# Patient Record
Sex: FEMALE | Race: WHITE | NOT HISPANIC OR LATINO | Employment: FULL TIME | URBAN - METROPOLITAN AREA
[De-identification: names, ages, dates, MRNs, and addresses within clinical notes are randomized per-mention and may not be internally consistent; named-entity substitution may affect disease eponyms.]

---

## 2023-10-24 PROBLEM — E53.8 VITAMIN B12 DEFICIENCY: Status: ACTIVE | Noted: 2023-10-24

## 2023-10-24 PROBLEM — C50.919 BREAST CANCER (MULTI): Status: ACTIVE | Noted: 2023-10-24

## 2023-10-24 PROBLEM — F41.9 ANXIETY: Status: ACTIVE | Noted: 2023-10-24

## 2023-10-24 RX ORDER — VENLAFAXINE HYDROCHLORIDE 75 MG/1
CAPSULE, EXTENDED RELEASE ORAL
COMMUNITY

## 2023-10-24 RX ORDER — MAGNESIUM 200 MG
TABLET ORAL
COMMUNITY
Start: 2017-06-01

## 2023-10-24 RX ORDER — EPINEPHRINE 0.3 MG/.3ML
INJECTION INTRAMUSCULAR
COMMUNITY
Start: 2018-08-04

## 2023-10-24 RX ORDER — VALACYCLOVIR HYDROCHLORIDE 500 MG/1
TABLET, FILM COATED ORAL
COMMUNITY
Start: 2018-10-09

## 2023-10-24 RX ORDER — LETROZOLE 2.5 MG/1
TABLET, FILM COATED ORAL
COMMUNITY

## 2023-10-24 RX ORDER — TAMOXIFEN CITRATE 20 MG/1
TABLET ORAL
COMMUNITY

## 2023-10-24 RX ORDER — ASCORBIC ACID 500 MG
TABLET ORAL
COMMUNITY

## 2023-10-24 ASSESSMENT — PATIENT HEALTH QUESTIONNAIRE - PHQ9
5. POOR APPETITE OR OVEREATING: NOT AT ALL
4. FEELING TIRED OR HAVING LITTLE ENERGY: SEVERAL DAYS
9. THOUGHTS THAT YOU WOULD BE BETTER OFF DEAD, OR OF HURTING YOURSELF: 0
3. TROUBLE FALLING OR STAYING ASLEEP OR SLEEPING TOO MUCH: NOT AT ALL
4. FEELING TIRED OR HAVING LITTLE ENERGY: 1
7. TROUBLE CONCENTRATING ON THINGS, SUCH AS READING THE NEWSPAPER OR WATCHING TELEVISION: NOT AT ALL
5. POOR APPETITE OR OVEREATING: NOT AT ALL
9. THOUGHTS THAT YOU WOULD BE BETTER OFF DEAD, OR OF HURTING YOURSELF: NOT AT ALL
SUM OF ALL RESPONSES TO PHQ QUESTIONS 1-9: 1
1. LITTLE INTEREST OR PLEASURE IN DOING THINGS: 0
1. LITTLE INTEREST OR PLEASURE IN DOING THINGS: NOT AT ALL
6. FEELING BAD ABOUT YOURSELF - OR THAT YOU ARE A FAILURE OR HAVE LET YOURSELF OR YOUR FAMILY DOWN: NOT AT ALL
6. FEELING BAD ABOUT YOURSELF - OR THAT YOU ARE A FAILURE OR HAVE LET YOURSELF OR YOUR FAMILY DOWN: 0
10. IF YOU CHECKED OFF ANY PROBLEMS, HOW DIFFICULT HAVE THESE PROBLEMS MADE IT FOR YOU TO DO YOUR WORK, TAKE CARE OF THINGS AT HOME, OR GET ALONG WITH OTHER PEOPLE: NOT DIFFICULT AT ALL
8. MOVING OR SPEAKING SO SLOWLY THAT OTHER PEOPLE COULD HAVE NOTICED. OR THE OPPOSITE, BEING SO FIGETY OR RESTLESS THAT YOU HAVE BEEN MOVING AROUND A LOT MORE THAN USUAL: NOT AT ALL
9. THOUGHTS THAT YOU WOULD BE BETTER OFF DEAD, OR OF HURTING YOURSELF: NOT AT ALL
7. TROUBLE CONCENTRATING ON THINGS, SUCH AS READING THE NEWSPAPER OR WATCHING TELEVISION: NOT AT ALL
2. FEELING DOWN, DEPRESSED OR HOPELESS: NOT AT ALL
1. LITTLE INTEREST OR PLEASURE IN DOING THINGS: NOT AT ALL
8. MOVING OR SPEAKING SO SLOWLY THAT OTHER PEOPLE COULD HAVE NOTICED. OR THE OPPOSITE, BEING SO FIGETY OR RESTLESS THAT YOU HAVE BEEN MOVING AROUND A LOT MORE THAN USUAL: NOT AT ALL
10. IF YOU CHECKED OFF ANY PROBLEMS, HOW DIFFICULT HAVE THESE PROBLEMS MADE IT FOR YOU TO DO YOUR WORK, TAKE CARE OF THINGS AT HOME, OR GET ALONG WITH OTHER PEOPLE: NOT DIFFICULT AT ALL
3. TROUBLE FALLING OR STAYING ASLEEP OR SLEEPING TOO MUCH: NOT AT ALL
6. FEELING BAD ABOUT YOURSELF - OR THAT YOU ARE A FAILURE OR HAVE LET YOURSELF OR YOUR FAMILY DOWN: NOT AT ALL
3. TROUBLE FALLING OR STAYING ASLEEP OR SLEEPING TOO MUCH: 0
5. POOR APPETITE OR OVEREATING: 0
2. FEELING DOWN, DEPRESSED, IRRITABLE, OR HOPELESS: 0
4. FEELING TIRED OR HAVING LITTLE ENERGY: SEVERAL DAYS
8. MOVING OR SPEAKING SO SLOWLY THAT OTHER PEOPLE COULD HAVE NOTICED. OR THE OPPOSITE, BEING SO FIGETY OR RESTLESS THAT YOU HAVE BEEN MOVING AROUND A LOT MORE THAN USUAL: 0
7. TROUBLE CONCENTRATING ON THINGS, SUCH AS READING THE NEWSPAPER OR WATCHING TELEVISION: 0
SUM OF ALL RESPONSES TO PHQ QUESTIONS 1-9: 1
2. FEELING DOWN, DEPRESSED, IRRITABLE, OR HOPELESS: NOT AT ALL

## 2023-10-25 ENCOUNTER — OFFICE VISIT (OUTPATIENT)
Dept: HEMATOLOGY/ONCOLOGY | Facility: CLINIC | Age: 58
End: 2023-10-25
Payer: COMMERCIAL

## 2023-10-25 VITALS
RESPIRATION RATE: 18 BRPM | WEIGHT: 184.97 LBS | OXYGEN SATURATION: 97 % | HEIGHT: 63 IN | TEMPERATURE: 96.3 F | SYSTOLIC BLOOD PRESSURE: 109 MMHG | HEART RATE: 94 BPM | BODY MASS INDEX: 32.77 KG/M2 | DIASTOLIC BLOOD PRESSURE: 61 MMHG

## 2023-10-25 DIAGNOSIS — Z17.0 MALIGNANT NEOPLASM OF LEFT BREAST IN FEMALE, ESTROGEN RECEPTOR POSITIVE, UNSPECIFIED SITE OF BREAST (MULTI): Primary | ICD-10-CM

## 2023-10-25 DIAGNOSIS — I89.0 LYMPHEDEMA OF BREAST: ICD-10-CM

## 2023-10-25 DIAGNOSIS — C50.912 MALIGNANT NEOPLASM OF LEFT BREAST IN FEMALE, ESTROGEN RECEPTOR POSITIVE, UNSPECIFIED SITE OF BREAST (MULTI): Primary | ICD-10-CM

## 2023-10-25 PROCEDURE — 99214 OFFICE O/P EST MOD 30 MIN: CPT

## 2023-10-25 PROCEDURE — 1036F TOBACCO NON-USER: CPT

## 2023-10-25 ASSESSMENT — ENCOUNTER SYMPTOMS
DEPRESSION: 0
OCCASIONAL FEELINGS OF UNSTEADINESS: 0
LOSS OF SENSATION IN FEET: 0

## 2023-10-25 ASSESSMENT — COLUMBIA-SUICIDE SEVERITY RATING SCALE - C-SSRS
6. HAVE YOU EVER DONE ANYTHING, STARTED TO DO ANYTHING, OR PREPARED TO DO ANYTHING TO END YOUR LIFE?: NO
1. IN THE PAST MONTH, HAVE YOU WISHED YOU WERE DEAD OR WISHED YOU COULD GO TO SLEEP AND NOT WAKE UP?: NO
2. HAVE YOU ACTUALLY HAD ANY THOUGHTS OF KILLING YOURSELF?: NO

## 2023-10-25 ASSESSMENT — PATIENT HEALTH QUESTIONNAIRE - PHQ9
1. LITTLE INTEREST OR PLEASURE IN DOING THINGS: NOT AT ALL
2. FEELING DOWN, DEPRESSED OR HOPELESS: NOT AT ALL
SUM OF ALL RESPONSES TO PHQ9 QUESTIONS 1 AND 2: 0

## 2023-10-25 ASSESSMENT — PAIN SCALES - GENERAL: PAINLEVEL: 0-NO PAIN

## 2023-10-25 NOTE — PROGRESS NOTES
Patient ID: Laura Espinoza is a 58 y.o. female.    The patient presents to clinic today for her history of breast cancer.     Cancer Staging   Breast cancer (CMS/[facility])  Staging form: Breast, AJCC 8th Edition  - Clinical: Stage IA (cT1mi, cN0, cM0, G2, ER+, PA+, HER2-) - Unsigned        Diagnostic/Therapeutic History:  She initially presented with an abnormal mammogram last October 2017 that showed asymmetry in the left breast.  Ultrasound done showed an ill defined hypoechoic mass identified at the 3 o'clock position measuring 7 x 5 x 5 mm. Mammogram guided biopsy done showed invasive lobular carcinoma, grade 2. The limited sample size measured0.3 cm. It is ER positive at 95%,  PA positive at 98% and HER-2 negative.     December 20, 2017, she had a wire localization lumpectomy and specimen showed four foci of malignancy.  They measured 7 mm, 2 mm, 9.5 mm and 0.5 mm of invasive lobular carcinoma, grade 2. Margins were not  involved. Two sentinel lymph nodes were negative. There was no lymphovascular invasion identified.     Oncotype score showed a low risk of reoccurrence with a reoccurrence score of 13. She underwent radiation therapy finishing last March 23, 2018, completing 42.56 Gy in 16 fractions to the left breast. She then started on Tamoxifen.  She continued on tamoxifen and followed with her GYN. There was some thickening of her uterus and because of that she had undergone a vaginal hysterectomy with bilateral oophorectomy. The pathology came back negative for cancer.  In December 2018, she was started on Arimidex. She complained of arthritic discomfort with Arimidex and because of that she was switched to exemestane, which she took for awhile, but that did not seem to be any better. Feb 2020 switched to letrozole.  Stopped all therapy May 2022- 1 year short of planned 5 years of therapy    History of Present Illness (HPI)/Interval History:  Ms. Espinoza presents for presents today for follow-up, treatment and  surveillance. She is on observational therapy.     She denies any new breast cancer concerns. Left breast feels heavier.     She denies any chest pain or breathing issues.     She denies any vision changes or headache issues, dizziness, loss of balance or falls.     She denies any new or unexplained bone aches or pains. Right knee pain since fall in Feb, tripped.     She denies any skin lesions or masses.    She reports a normal appetite and normal bowel movements.    She denies any issues with sleep, fatigue,  or mood swings. Mild but manageable hot flashes, getting better with time.     Review of Systems:  14-point ROS otherwise negative, as per HPI.    Past Medical History:   Diagnosis Date    Personal history of malignant neoplasm of breast 04/08/2020    Personal history of malignant neoplasm of breast       Past Surgical History:   Procedure Laterality Date    BI MAMMO GUIDED LOCALIZATION BREAST LEFT Left 12/20/2017    BI MAMMO GUIDED LOCALIZATION BREAST LEFT 12/20/2017 VAL SURG [facility]       Social History     Socioeconomic History    Marital status:      Spouse name: None    Number of children: None    Years of education: None    Highest education level: None   Occupational History    None   Tobacco Use    Smoking status: Never    Smokeless tobacco: Never   Vaping Use    Vaping Use: Never used   Substance and Sexual Activity    Alcohol use: Yes     Comment: holidays    Drug use: Never    Sexual activity: None   Other Topics Concern    None   Social History Narrative    None     Social Determinants of Health     Financial Resource Strain: Not on file   Food Insecurity: Not on file   Transportation Needs: Not on file   Physical Activity: Not on file   Stress: Not on file   Social Connections: Not on file   Intimate Partner Violence: Not on file   Housing Stability: Not on file       Allergies   Allergen Reactions    Bee Venom Protein (Honey Bee) Other         Current Outpatient Medications:      "ascorbic acid (Vitamin C) 500 mg tablet, Vitamin C TABS  Refills: 0     Active, Disp: , Rfl:     bupropion HCl (WELLBUTRIN ORAL), Wellbutrin TABS  Refills: 0     Active, Disp: , Rfl:     cholecalciferol, vitamin D3, (VITAMIN D3 ORAL), 1 cap(s) orally once a day, Disp: , Rfl:     cyanocobalamin, vitamin B-12, 1,000 mcg tablet, sublingual, 1 tablet under the tongue and allow to dissolve Sublingual Once a day for 90 day(s), Disp: , Rfl:     EPINEPHrine (EpiPen 2-Beni) 0.3 mg/0.3 mL injection syringe, 0.3 milligram(s) intramuscularly once a day, As Needed, Disp: , Rfl:     letrozole (Femara) 2.5 mg tablet, Letrozole TABS  Refills: 0     Active, Disp: , Rfl:     tamoxifen (Nolvadex) 20 mg tablet, 1 tablet Orally Once a day, Disp: , Rfl:     valACYclovir (Valtrex) 500 mg tablet, 2 tablets Orally tid for 7 day(s), Disp: , Rfl:     venlafaxine XR (Effexor XR) 75 mg 24 hr capsule, 1 tab oral qhs, Disp: , Rfl:      Objective    BSA: 1.93 meters squared  /61 (BP Location: Left arm, Patient Position: Sitting, BP Cuff Size: Adult)   Pulse 94   Temp 35.7 °C (96.3 °F)   Resp 18   Ht 1.591 m (5' 2.64\")   Wt 83.9 kg (184 lb 15.5 oz)   SpO2 97%   BMI 33.15 kg/m²     Performance Status:  The ECOG performance scale today is ECO- Fully active, able to carry on all pre-disease performance w/o restriction.    Physical Exam  Vitals reviewed.   Constitutional:       General: She is awake. She is not in acute distress.     Appearance: Normal appearance. She is not ill-appearing.   HENT:      Mouth/Throat:      Pharynx: Oropharynx is clear. No oropharyngeal exudate.   Eyes:      General: No scleral icterus.     Conjunctiva/sclera: Conjunctivae normal.   Neck:      Trachea: Trachea and phonation normal. No tracheal tenderness.   Cardiovascular:      Rate and Rhythm: Normal rate and regular rhythm.      Heart sounds: No murmur heard.     No friction rub. No gallop.   Pulmonary:      Effort: Pulmonary effort is normal. No " respiratory distress.      Breath sounds: Normal breath sounds. No stridor. No wheezing, rhonchi or rales.   Chest:      Chest wall: No mass, lacerations, deformity or tenderness.   Breasts:     Right: No swelling, mass, nipple discharge, skin change or tenderness.      Left: Swelling (lymphedema in the breast) present. No mass, nipple discharge, skin change or tenderness.      Comments: S/p left lumpectomy   Abdominal:      General: Abdomen is flat. There is no distension.      Palpations: Abdomen is soft. There is no mass.      Tenderness: There is no abdominal tenderness.   Lymphadenopathy:      Cervical: No cervical adenopathy.      Upper Body:      Right upper body: No supraclavicular, axillary or pectoral adenopathy.      Left upper body: No supraclavicular, axillary or pectoral adenopathy.   Skin:     General: Skin is warm and dry.      Coloration: Skin is not jaundiced.      Findings: No lesion or rash.   Neurological:      General: No focal deficit present.      Mental Status: She is alert and oriented to person, place, and time.      Motor: No weakness.      Gait: Gait normal.   Psychiatric:         Mood and Affect: Mood normal.         Thought Content: Thought content normal.         Judgment: Judgment normal.         Laboratory Data:  Lab Results   Component Value Date    WBC 3.9 (L) 04/14/2021    HGB 12.7 04/14/2021    HCT 39.0 04/14/2021    MCV 92.9 04/14/2021     04/14/2021       Chemistry    Lab Results   Component Value Date/Time     05/12/2022 1110    K 3.9 05/12/2022 1110     05/12/2022 1110    CO2 30 05/12/2022 1110    BUN 13 05/12/2022 1110    CREATININE 0.77 05/12/2022 1110    Lab Results   Component Value Date/Time    CALCIUM 9.2 05/12/2022 1110    ALKPHOS 79 05/12/2022 1110    AST 19 05/12/2022 1110    ALT 18 05/12/2022 1110    BILITOT 1.0 05/12/2022 1110             Radiology:  BI digital DIAG mamm  Narrative: Interpreted By:  DAYNE PLEITEZ MD  MRN: [id number]  Patient  Name: LAURA ESPINOZA     STUDY:  Bilateral digital  diagnostic mammograms with tomosynthesis imaging  dated  6/5/2023.     INDICATION:  History of left breast cancer with lumpectomy and radiation treatment.     COMPARISON:  Mammograms dated  10/22/2021 through 06/01/2022.     ACCESSION NUMBER(S):  [id number]     ORDERING CLINICIAN:  ERIC ROONEY     TECHNIQUE:  Bilateral  CC and MLO  2D digital mammograms and digital breast  tomosynthesis mammograms were obtained.   CAD was utilized for the  interpretation of these mammograms.  Bilateral 3-D volume mammograms  were reconstructed in 1 mm slices in the  CC and MLO planes.     FINDINGS:  There are areas of scattered fibroglandular tissue.  Postoperative  and postradiation changes in the left breast similar to prior. No  suspicious mass or asymmetry is evident.  No suspicious calcification  or architectural distortion is evident.  The breasts are similar to  prior mammograms.     Impression: No mammographic finding is evident to suggest malignancy. Recommend  return for yearly screening bilateral mammograms or sooner if  clinically warranted.     BI-RADS CATEGORY:     Category: 2 - Benign.  Recommendation: 1 Year Screening.       No results found for this or any previous visit from the past 365 days.          Assessment/Plan:    Laura Espinoza is a 58 y.o. female with a history of left breast cancer, who presents today follow-up evaluation.    Assess/Plan SmartLinks:   Problem List Items Addressed This Visit             ICD-10-CM    Breast cancer (CMS/[facility]) - Primary C50.919     - Next mammogram due after 6/5/2024, ordered today   - Continue observational therapy          Relevant Orders    BI mammo bilateral screening tomosynthesis    Clinic Appointment Request NATALIE JIANG    Lymphedema of breast I89.0     - Good compression bra and massage   - If worsening would refer to lymphedema clinic               Disposition.  - RTC 1 year with Natalie Jiang PA-C and  mammogram in June 2024   - She was given our contact information and instructed to call with concerns/questions in the interim.    No orders of the defined types were placed in this encounter.            Natalie Jiang PA-C  Hematology and Medical Oncology  [facility]

## 2024-06-06 ENCOUNTER — HOSPITAL ENCOUNTER (OUTPATIENT)
Dept: RADIOLOGY | Facility: HOSPITAL | Age: 59
Discharge: HOME | End: 2024-06-06
Payer: COMMERCIAL

## 2024-06-06 VITALS — BODY MASS INDEX: 31.71 KG/M2 | WEIGHT: 179 LBS | HEIGHT: 63 IN

## 2024-06-06 DIAGNOSIS — C50.912 MALIGNANT NEOPLASM OF LEFT BREAST IN FEMALE, ESTROGEN RECEPTOR POSITIVE, UNSPECIFIED SITE OF BREAST (MULTI): ICD-10-CM

## 2024-06-06 DIAGNOSIS — Z17.0 MALIGNANT NEOPLASM OF LEFT BREAST IN FEMALE, ESTROGEN RECEPTOR POSITIVE, UNSPECIFIED SITE OF BREAST (MULTI): ICD-10-CM

## 2024-06-06 PROCEDURE — 77067 SCR MAMMO BI INCL CAD: CPT

## 2024-06-06 PROCEDURE — 77063 BREAST TOMOSYNTHESIS BI: CPT | Performed by: RADIOLOGY

## 2024-06-06 PROCEDURE — 77067 SCR MAMMO BI INCL CAD: CPT | Performed by: RADIOLOGY

## 2024-10-21 NOTE — PROGRESS NOTES
Airway assessment complete   Patient ID: Laura Espinoza is a 59 y.o. female.    The patient presents to clinic today for her history of breast cancer.     Cancer Staging   Breast cancer (Multi)  Staging form: Breast, AJCC 8th Edition  - Clinical: Stage IA (cT1mi, cN0, cM0, G2, ER+, AZ+, HER2-) - Unsigned        Diagnostic/Therapeutic History:  She initially presented with an abnormal mammogram last October 2017 that showed asymmetry in the left breast.  Ultrasound done showed an ill defined hypoechoic mass identified at the 3 o'clock position measuring 7 x 5 x 5 mm. Mammogram guided biopsy done showed invasive lobular carcinoma, grade 2. The limited sample size measured0.3 cm. It is ER positive at 95%,  AZ positive at 98% and HER-2 negative.     December 20, 2017, she had a wire localization lumpectomy and specimen showed four foci of malignancy.  They measured 7 mm, 2 mm, 9.5 mm and 0.5 mm of invasive lobular carcinoma, grade 2. Margins were not  involved. Two sentinel lymph nodes were negative. There was no lymphovascular invasion identified.     Oncotype score showed a low risk of reoccurrence with a reoccurrence score of 13. She underwent radiation therapy finishing last March 23, 2018, completing 42.56 Gy in 16 fractions to the left breast. She then started on Tamoxifen.  She continued on tamoxifen and followed with her GYN. There was some thickening of her uterus and because of that she had undergone a vaginal hysterectomy with bilateral oophorectomy. The pathology came back negative for cancer.  In December 2018, she was started on Arimidex. She complained of arthritic discomfort with Arimidex and because of that she was switched to exemestane, which she took for awhile, but that did not seem to be any better. Feb 2020 switched to letrozole.  Stopped all therapy May 2022- 1 year short of planned 5 years of therapy    History of Present Illness (HPI)/Interval History:  Ms. Espinoza presents for presents today for follow-up, treatment and  surveillance. She is on observational therapy.     She denies any new breast cancer concerns. Some occasional tenderness to the left breast but stable.     She denies any chest pain or breathing issues.     She denies any vision changes, dizziness, loss of balance or falls. More frequent headaches in the last month, takes ibuprofen as needed.     She denies any new or unexplained bone aches or pains.     She denies any skin lesions or masses.    She reports a normal appetite.     She denies any issues with sleep, fatigue,  or mood swings. Mild but manageable hot flashes, getting better with time.     Review of Systems:  14-point ROS otherwise negative, as per HPI.    Past Medical History:   Diagnosis Date    Breast cancer (Multi)     Personal history of malignant neoplasm of breast 04/08/2020    Personal history of malignant neoplasm of breast       Past Surgical History:   Procedure Laterality Date    BI MAMMO GUIDED BREAST LEFT LOCALIZATION Left 12/20/2017    BI MAMMO GUIDED LOCALIZATION BREAST LEFT 12/20/2017 VAL SURG [facility]    BREAST LUMPECTOMY Left 2017    rad tx, no chemo       Social History     Socioeconomic History    Marital status:    Tobacco Use    Smoking status: Never    Smokeless tobacco: Never   Vaping Use    Vaping status: Never Used   Substance and Sexual Activity    Alcohol use: Yes     Comment: holidays    Drug use: Never       Allergies   Allergen Reactions    Bee Venom Protein (Honey Bee) Other         Current Outpatient Medications:     ascorbic acid (Vitamin C) 500 mg tablet, Vitamin C TABS  Refills: 0     Active, Disp: , Rfl:     bupropion HCl (WELLBUTRIN ORAL), Wellbutrin TABS  Refills: 0     Active, Disp: , Rfl:     cholecalciferol, vitamin D3, (VITAMIN D3 ORAL), 1 cap(s) orally once a day, Disp: , Rfl:     cyanocobalamin, vitamin B-12, 1,000 mcg tablet, sublingual, 1 tablet under the tongue and allow to dissolve Sublingual Once a day for 90 day(s), Disp: , Rfl:     EPINEPHrine  (EpiPen 2-Beni) 0.3 mg/0.3 mL injection syringe, 0.3 milligram(s) intramuscularly once a day, As Needed, Disp: , Rfl:     letrozole (Femara) 2.5 mg tablet, Letrozole TABS  Refills: 0     Active, Disp: , Rfl:     tamoxifen (Nolvadex) 20 mg tablet, 1 tablet Orally Once a day, Disp: , Rfl:     valACYclovir (Valtrex) 500 mg tablet, 2 tablets Orally tid for 7 day(s), Disp: , Rfl:     venlafaxine XR (Effexor XR) 75 mg 24 hr capsule, 1 tab oral qhs, Disp: , Rfl:      Objective    BSA: There is no height or weight on file to calculate BSA.  There were no vitals taken for this visit.    Performance Status:  The ECOG performance scale today is ECO- Fully active, able to carry on all pre-disease performance w/o restriction.    Physical Exam  Vitals reviewed.   Constitutional:       General: She is awake. She is not in acute distress.     Appearance: Normal appearance. She is not ill-appearing.   HENT:      Mouth/Throat:      Pharynx: Oropharynx is clear. No oropharyngeal exudate.   Eyes:      General: No scleral icterus.     Conjunctiva/sclera: Conjunctivae normal.   Neck:      Trachea: Trachea and phonation normal. No tracheal tenderness.   Cardiovascular:      Rate and Rhythm: Normal rate and regular rhythm.      Heart sounds: No murmur heard.     No friction rub. No gallop.   Pulmonary:      Effort: Pulmonary effort is normal. No respiratory distress.      Breath sounds: Normal breath sounds. No stridor. No wheezing, rhonchi or rales.   Chest:      Chest wall: No mass, lacerations, deformity or tenderness.   Breasts:     Right: No swelling, mass, nipple discharge, skin change or tenderness.      Left: No swelling, mass, nipple discharge, skin change or tenderness.      Comments: S/p left lumpectomy   Abdominal:      General: Abdomen is flat. There is no distension.      Palpations: Abdomen is soft. There is no mass.      Tenderness: There is no abdominal tenderness.   Lymphadenopathy:      Cervical: No cervical  adenopathy.      Upper Body:      Right upper body: No supraclavicular, axillary or pectoral adenopathy.      Left upper body: No supraclavicular, axillary or pectoral adenopathy.   Skin:     General: Skin is warm and dry.      Coloration: Skin is not jaundiced.      Findings: No lesion or rash.   Neurological:      General: No focal deficit present.      Mental Status: She is alert and oriented to person, place, and time.      Motor: No weakness.      Gait: Gait normal.   Psychiatric:         Mood and Affect: Mood normal.         Thought Content: Thought content normal.         Judgment: Judgment normal.         Laboratory Data:  Lab Results   Component Value Date    WBC 3.9 (L) 04/14/2021    HGB 12.7 04/14/2021    HCT 39.0 04/14/2021    MCV 92.9 04/14/2021     04/14/2021       Chemistry    Lab Results   Component Value Date/Time     05/12/2022 1110    K 3.9 05/12/2022 1110     05/12/2022 1110    CO2 30 05/12/2022 1110    BUN 13 05/12/2022 1110    CREATININE 0.77 05/12/2022 1110    Lab Results   Component Value Date/Time    CALCIUM 9.2 05/12/2022 1110    ALKPHOS 79 05/12/2022 1110    AST 19 05/12/2022 1110    ALT 18 05/12/2022 1110    BILITOT 1.0 05/12/2022 1110             Radiology:  BI mammo bilateral screening tomosynthesis  Narrative: Interpreted By:  Keya Correia,   STUDY:  BI MAMMO BILATERAL SCREENING TOMOSYNTHESIS;  6/6/2024 10:38 am      ACCESSION NUMBER(S):  [id number]      ORDERING CLINICIAN:  ENRIQUETA WHITE      INDICATION:  Screening.      COMPARISON:  None.      FINDINGS:  2D and tomosynthesis images were reviewed at 1 mm slice thickness.  Study was reviewed with the aid of CAD.      Density:  There are areas of scattered fibroglandular tissue.      Postoperative changes in the left breast similar to prior. No  suspicious masses or calcifications are identified.      Impression: No mammographic evidence of malignancy.      BI-RADS CATEGORY:  BI-RADS Category:  2  Benign.  Recommendation:  Annual Screening.  Recommended Date:  1 Year.  Laterality:  Bilateral.      For any future breast imaging appointments, please call [phone]).      MACRO:  None      Signed by: Keya Correia 6/6/2024 4:35 PM  Dictation workstation:   [id number]       No results found for this or any previous visit from the past 365 days.          Assessment/Plan:    Laura Espinoza is a 59 y.o. female with a history of left breast cancer, who presents today follow-up evaluation.    Left breast cancer   - Next mammogram due after 6/6/2025, ordered today   - Continue observational therapy     Left breast lymphedmea   - Good compression bra and massage   - If worsening would refer to lymphedema clinic or Dr. Trejo     She is over due to routine blood work, have placed orders as she still needs to establish / reach out to her PCP.     Disposition.  - RTC 1 year with Natalie Jiang PA-C and mammogram in June 2025  - She was given our contact information and instructed to call with concerns/questions in the interim.    No orders of the defined types were placed in this encounter.            Natalie Jiang PA-C  Hematology and Medical Oncology  [facility]

## 2024-10-23 ENCOUNTER — OFFICE VISIT (OUTPATIENT)
Dept: HEMATOLOGY/ONCOLOGY | Facility: CLINIC | Age: 59
End: 2024-10-23
Payer: COMMERCIAL

## 2024-10-23 VITALS
WEIGHT: 182.98 LBS | TEMPERATURE: 97.3 F | HEIGHT: 63 IN | OXYGEN SATURATION: 96 % | SYSTOLIC BLOOD PRESSURE: 105 MMHG | DIASTOLIC BLOOD PRESSURE: 62 MMHG | RESPIRATION RATE: 16 BRPM | BODY MASS INDEX: 32.42 KG/M2 | HEART RATE: 77 BPM

## 2024-10-23 DIAGNOSIS — Z17.0 MALIGNANT NEOPLASM OF LEFT BREAST IN FEMALE, ESTROGEN RECEPTOR POSITIVE, UNSPECIFIED SITE OF BREAST: ICD-10-CM

## 2024-10-23 DIAGNOSIS — Z12.31 SCREENING MAMMOGRAM FOR BREAST CANCER: ICD-10-CM

## 2024-10-23 DIAGNOSIS — C50.912 MALIGNANT NEOPLASM OF LEFT BREAST IN FEMALE, ESTROGEN RECEPTOR POSITIVE, UNSPECIFIED SITE OF BREAST: ICD-10-CM

## 2024-10-23 DIAGNOSIS — Z00.00 BLOOD TESTS FOR ROUTINE GENERAL PHYSICAL EXAMINATION: Primary | ICD-10-CM

## 2024-10-23 PROCEDURE — 3008F BODY MASS INDEX DOCD: CPT

## 2024-10-23 PROCEDURE — 99214 OFFICE O/P EST MOD 30 MIN: CPT

## 2024-10-23 ASSESSMENT — PAIN SCALES - GENERAL: PAINLEVEL_OUTOF10: 0-NO PAIN

## 2024-10-23 ASSESSMENT — PATIENT HEALTH QUESTIONNAIRE - PHQ9
1. LITTLE INTEREST OR PLEASURE IN DOING THINGS: NOT AT ALL
SUM OF ALL RESPONSES TO PHQ9 QUESTIONS 1 AND 2: 0
2. FEELING DOWN, DEPRESSED OR HOPELESS: NOT AT ALL

## 2024-10-31 ENCOUNTER — LAB (OUTPATIENT)
Dept: LAB | Facility: LAB | Age: 59
End: 2024-10-31
Payer: COMMERCIAL

## 2024-10-31 DIAGNOSIS — Z00.00 BLOOD TESTS FOR ROUTINE GENERAL PHYSICAL EXAMINATION: ICD-10-CM

## 2024-10-31 LAB
25(OH)D3 SERPL-MCNC: 23 NG/ML (ref 30–100)
ALBUMIN SERPL BCP-MCNC: 4.3 G/DL (ref 3.4–5)
ALP SERPL-CCNC: 82 U/L (ref 33–110)
ALT SERPL W P-5'-P-CCNC: 18 U/L (ref 7–45)
ANION GAP SERPL CALC-SCNC: 10 MMOL/L (ref 10–20)
AST SERPL W P-5'-P-CCNC: 19 U/L (ref 9–39)
BASOPHILS # BLD AUTO: 0.06 X10*3/UL (ref 0–0.1)
BASOPHILS NFR BLD AUTO: 1.8 %
BILIRUB SERPL-MCNC: 1 MG/DL (ref 0–1.2)
BUN SERPL-MCNC: 12 MG/DL (ref 6–23)
CALCIUM SERPL-MCNC: 9.6 MG/DL (ref 8.6–10.3)
CHLORIDE SERPL-SCNC: 106 MMOL/L (ref 98–107)
CHOLEST SERPL-MCNC: 193 MG/DL (ref 0–199)
CHOLESTEROL/HDL RATIO: 2.4
CO2 SERPL-SCNC: 30 MMOL/L (ref 21–32)
CREAT SERPL-MCNC: 0.81 MG/DL (ref 0.5–1.05)
EGFRCR SERPLBLD CKD-EPI 2021: 85 ML/MIN/1.73M*2
EOSINOPHIL # BLD AUTO: 0.08 X10*3/UL (ref 0–0.7)
EOSINOPHIL NFR BLD AUTO: 2.4 %
ERYTHROCYTE [DISTWIDTH] IN BLOOD BY AUTOMATED COUNT: 13 % (ref 11.5–14.5)
EST. AVERAGE GLUCOSE BLD GHB EST-MCNC: 100 MG/DL
GLUCOSE SERPL-MCNC: 84 MG/DL (ref 74–99)
HBA1C MFR BLD: 5.1 %
HCT VFR BLD AUTO: 38.8 % (ref 36–46)
HDLC SERPL-MCNC: 80 MG/DL
HGB BLD-MCNC: 12.5 G/DL (ref 12–16)
IMM GRANULOCYTES # BLD AUTO: 0.01 X10*3/UL (ref 0–0.7)
IMM GRANULOCYTES NFR BLD AUTO: 0.3 % (ref 0–0.9)
LDLC SERPL CALC-MCNC: 96 MG/DL
LYMPHOCYTES # BLD AUTO: 0.69 X10*3/UL (ref 1.2–4.8)
LYMPHOCYTES NFR BLD AUTO: 21.1 %
MCH RBC QN AUTO: 29.5 PG (ref 26–34)
MCHC RBC AUTO-ENTMCNC: 32.2 G/DL (ref 32–36)
MCV RBC AUTO: 92 FL (ref 80–100)
MONOCYTES # BLD AUTO: 0.32 X10*3/UL (ref 0.1–1)
MONOCYTES NFR BLD AUTO: 9.8 %
NEUTROPHILS # BLD AUTO: 2.11 X10*3/UL (ref 1.2–7.7)
NEUTROPHILS NFR BLD AUTO: 64.6 %
NON HDL CHOLESTEROL: 113 MG/DL (ref 0–149)
NRBC BLD-RTO: 0 /100 WBCS (ref 0–0)
PLATELET # BLD AUTO: 282 X10*3/UL (ref 150–450)
POTASSIUM SERPL-SCNC: 4.2 MMOL/L (ref 3.5–5.3)
PROT SERPL-MCNC: 6.9 G/DL (ref 6.4–8.2)
RBC # BLD AUTO: 4.24 X10*6/UL (ref 4–5.2)
SODIUM SERPL-SCNC: 142 MMOL/L (ref 136–145)
TRIGL SERPL-MCNC: 83 MG/DL (ref 0–149)
TSH SERPL-ACNC: 2.3 MIU/L (ref 0.44–3.98)
VLDL: 17 MG/DL (ref 0–40)
WBC # BLD AUTO: 3.3 X10*3/UL (ref 4.4–11.3)

## 2024-10-31 PROCEDURE — 83036 HEMOGLOBIN GLYCOSYLATED A1C: CPT

## 2024-10-31 PROCEDURE — 36415 COLL VENOUS BLD VENIPUNCTURE: CPT

## 2024-10-31 PROCEDURE — 82306 VITAMIN D 25 HYDROXY: CPT

## 2025-04-09 ENCOUNTER — OFFICE VISIT (OUTPATIENT)
Dept: PRIMARY CARE | Facility: CLINIC | Age: 60
End: 2025-04-09
Payer: COMMERCIAL

## 2025-04-09 VITALS
SYSTOLIC BLOOD PRESSURE: 122 MMHG | BODY MASS INDEX: 32.25 KG/M2 | HEIGHT: 63 IN | DIASTOLIC BLOOD PRESSURE: 80 MMHG | WEIGHT: 182 LBS | HEART RATE: 71 BPM | OXYGEN SATURATION: 99 %

## 2025-04-09 DIAGNOSIS — Z00.00 ROUTINE GENERAL MEDICAL EXAMINATION AT A HEALTH CARE FACILITY: Primary | ICD-10-CM

## 2025-04-09 DIAGNOSIS — B07.9 VIRAL WARTS, UNSPECIFIED TYPE: ICD-10-CM

## 2025-04-09 DIAGNOSIS — E53.8 VITAMIN B12 DEFICIENCY: ICD-10-CM

## 2025-04-09 DIAGNOSIS — Z12.11 ENCOUNTER FOR SCREENING FOR MALIGNANT NEOPLASM OF COLON: ICD-10-CM

## 2025-04-09 DIAGNOSIS — E55.9 VITAMIN D DEFICIENCY: ICD-10-CM

## 2025-04-09 DIAGNOSIS — Z17.0 MALIGNANT NEOPLASM OF LEFT BREAST IN FEMALE, ESTROGEN RECEPTOR POSITIVE, UNSPECIFIED SITE OF BREAST: ICD-10-CM

## 2025-04-09 DIAGNOSIS — R35.1 NOCTURIA: ICD-10-CM

## 2025-04-09 DIAGNOSIS — C50.912 MALIGNANT NEOPLASM OF LEFT BREAST IN FEMALE, ESTROGEN RECEPTOR POSITIVE, UNSPECIFIED SITE OF BREAST: ICD-10-CM

## 2025-04-09 PROBLEM — F41.9 ANXIETY: Status: RESOLVED | Noted: 2023-10-24 | Resolved: 2025-04-09

## 2025-04-09 PROCEDURE — 99396 PREV VISIT EST AGE 40-64: CPT | Performed by: FAMILY MEDICINE

## 2025-04-09 PROCEDURE — 3008F BODY MASS INDEX DOCD: CPT | Performed by: FAMILY MEDICINE

## 2025-04-09 PROCEDURE — 17110 DESTRUCTION B9 LES UP TO 14: CPT | Performed by: FAMILY MEDICINE

## 2025-04-09 PROCEDURE — 1036F TOBACCO NON-USER: CPT | Performed by: FAMILY MEDICINE

## 2025-04-09 RX ORDER — BUPROPION HYDROCHLORIDE 300 MG/1
300 TABLET ORAL EVERY MORNING
COMMUNITY
Start: 2025-04-08

## 2025-04-09 ASSESSMENT — ENCOUNTER SYMPTOMS
DIZZINESS: 0
PALPITATIONS: 0
CONFUSION: 0
NERVOUS/ANXIOUS: 0
MYALGIAS: 0
VOMITING: 0
WEAKNESS: 0
CHILLS: 0
COUGH: 0
HEMATURIA: 0
FREQUENCY: 0
SHORTNESS OF BREATH: 0
NAUSEA: 0
FEVER: 0
CONSTIPATION: 0
WHEEZING: 0
FATIGUE: 1
ARTHRALGIAS: 0

## 2025-04-09 ASSESSMENT — PATIENT HEALTH QUESTIONNAIRE - PHQ9
2. FEELING DOWN, DEPRESSED OR HOPELESS: NOT AT ALL
SUM OF ALL RESPONSES TO PHQ9 QUESTIONS 1 AND 2: 0
1. LITTLE INTEREST OR PLEASURE IN DOING THINGS: NOT AT ALL

## 2025-04-09 ASSESSMENT — PAIN SCALES - GENERAL: PAINLEVEL_OUTOF10: 0-NO PAIN

## 2025-04-09 NOTE — PROGRESS NOTES
"Subjective   Patient ID: Laura Espinoza is a 59 y.o. female who presents for Annual Exam.     Medications reviewed:          Current medications Use reviewed and recorded. Vitamin/Mineral supplements Use reviewed and recorded.      Review family history:          Reviewed See family history.      Review past history:          Reviewed See past history.      Review surgical history:          Reviewed See surgical history.   Had a colonoscopy 11/2020- repeat in 10 years  S/P TAHBSO, mammogram is UTD with her oncologist  Agrees to see dermatologist  Agrees to get labs checked  Has a wart on her right hand - failed OTC treatment         Review of Systems   Constitutional:  Positive for fatigue. Negative for chills and fever.   HENT:  Negative for ear pain and postnasal drip.    Respiratory:  Negative for cough, shortness of breath and wheezing.    Cardiovascular:  Negative for chest pain, palpitations and leg swelling.   Gastrointestinal:  Negative for constipation, nausea and vomiting.   Genitourinary:  Negative for frequency and hematuria.   Musculoskeletal:  Negative for arthralgias and myalgias.   Skin:  Negative for rash.   Neurological:  Negative for dizziness and weakness.   Psychiatric/Behavioral:  Negative for confusion. The patient is not nervous/anxious.    Vit D def, nocturia, vit B12 def    Objective   /80   Pulse 71   Ht 1.6 m (5' 3\")   Wt 82.6 kg (182 lb)   SpO2 99%   BMI 32.24 kg/m²     Physical Exam  Constitutional:       General: She is not in acute distress.     Appearance: Normal appearance. She is not ill-appearing.   HENT:      Head: Normocephalic.      Right Ear: Tympanic membrane and external ear normal.      Left Ear: Tympanic membrane and external ear normal.      Nose: Nose normal. No congestion.      Mouth/Throat:      Mouth: Mucous membranes are moist.      Pharynx: No posterior oropharyngeal erythema.   Eyes:      Extraocular Movements: Extraocular movements intact.      " Conjunctiva/sclera: Conjunctivae normal.      Pupils: Pupils are equal, round, and reactive to light.   Cardiovascular:      Rate and Rhythm: Normal rate and regular rhythm.      Pulses: Normal pulses.      Heart sounds: Normal heart sounds. No murmur heard.  Pulmonary:      Effort: Pulmonary effort is normal. No respiratory distress.      Breath sounds: Normal breath sounds. No wheezing.   Abdominal:      General: Bowel sounds are normal.      Palpations: Abdomen is soft. There is no mass.      Tenderness: There is no abdominal tenderness.      Hernia: No hernia is present.   Genitourinary:     Comments: Sees GYN for her pelvic and breast exam  Musculoskeletal:         General: Normal range of motion.      Cervical back: Neck supple. No tenderness.      Right lower leg: No edema.      Left lower leg: No edema.   Lymphadenopathy:      Cervical: No cervical adenopathy.   Skin:     General: Skin is warm.      Findings: No rash.      Comments: Wart on right hand x 1   Neurological:      General: No focal deficit present.      Mental Status: She is alert and oriented to person, place, and time.      Gait: Gait normal.   Psychiatric:         Mood and Affect: Mood normal.         Behavior: Behavior normal.         Assessment/Plan

## 2025-04-11 LAB
25(OH)D3+25(OH)D2 SERPL-MCNC: 30 NG/ML (ref 30–100)
APPEARANCE UR: ABNORMAL
BACTERIA #/AREA URNS HPF: ABNORMAL /HPF
BILIRUB UR QL STRIP: NEGATIVE
CAOX CRY #/AREA URNS HPF: ABNORMAL /HPF
COLOR UR: ABNORMAL
GLUCOSE UR QL STRIP: NEGATIVE
HGB UR QL STRIP: NEGATIVE
HYALINE CASTS #/AREA URNS LPF: ABNORMAL /LPF
KETONES UR QL STRIP: ABNORMAL
LEUKOCYTE ESTERASE UR QL STRIP: ABNORMAL
NITRITE UR QL STRIP: NEGATIVE
PH UR STRIP: 6 [PH] (ref 5–8)
PROT UR QL STRIP: ABNORMAL
RBC #/AREA URNS HPF: ABNORMAL /HPF
SERVICE CMNT-IMP: ABNORMAL
SP GR UR STRIP: 1.02 (ref 1–1.03)
SQUAMOUS #/AREA URNS HPF: ABNORMAL /HPF
VIT B12 SERPL-MCNC: >2000 PG/ML (ref 200–1100)
WBC #/AREA URNS HPF: ABNORMAL /HPF

## 2025-05-08 ENCOUNTER — PATIENT MESSAGE (OUTPATIENT)
Dept: PRIMARY CARE | Facility: CLINIC | Age: 60
End: 2025-05-08
Payer: COMMERCIAL

## 2025-05-09 RX ORDER — SEMAGLUTIDE 0.25 MG/.5ML
0.25 INJECTION, SOLUTION SUBCUTANEOUS WEEKLY
Qty: 2 ML | Refills: 0 | Status: SHIPPED | OUTPATIENT
Start: 2025-05-09 | End: 2025-05-31

## 2025-06-06 ENCOUNTER — PATIENT MESSAGE (OUTPATIENT)
Dept: PRIMARY CARE | Facility: CLINIC | Age: 60
End: 2025-06-06
Payer: COMMERCIAL

## 2025-06-06 RX ORDER — SEMAGLUTIDE 0.5 MG/.5ML
0.5 INJECTION, SOLUTION SUBCUTANEOUS WEEKLY
Qty: 2 ML | Refills: 0 | Status: SHIPPED | OUTPATIENT
Start: 2025-06-06 | End: 2025-06-28

## 2025-06-09 ENCOUNTER — HOSPITAL ENCOUNTER (OUTPATIENT)
Dept: RADIOLOGY | Facility: HOSPITAL | Age: 60
Discharge: HOME | End: 2025-06-09
Payer: COMMERCIAL

## 2025-06-09 VITALS — BODY MASS INDEX: 29.95 KG/M2 | WEIGHT: 169 LBS | HEIGHT: 63 IN

## 2025-06-09 DIAGNOSIS — Z12.31 SCREENING MAMMOGRAM FOR BREAST CANCER: ICD-10-CM

## 2025-06-09 DIAGNOSIS — Z17.0 MALIGNANT NEOPLASM OF LEFT BREAST IN FEMALE, ESTROGEN RECEPTOR POSITIVE, UNSPECIFIED SITE OF BREAST: ICD-10-CM

## 2025-06-09 DIAGNOSIS — C50.912 MALIGNANT NEOPLASM OF LEFT BREAST IN FEMALE, ESTROGEN RECEPTOR POSITIVE, UNSPECIFIED SITE OF BREAST: ICD-10-CM

## 2025-06-09 DIAGNOSIS — Z00.00 BLOOD TESTS FOR ROUTINE GENERAL PHYSICAL EXAMINATION: ICD-10-CM

## 2025-06-09 PROCEDURE — 77063 BREAST TOMOSYNTHESIS BI: CPT | Performed by: RADIOLOGY

## 2025-06-09 PROCEDURE — 77067 SCR MAMMO BI INCL CAD: CPT | Performed by: RADIOLOGY

## 2025-06-09 PROCEDURE — 77067 SCR MAMMO BI INCL CAD: CPT

## 2025-08-29 ENCOUNTER — PATIENT MESSAGE (OUTPATIENT)
Dept: PRIMARY CARE | Facility: CLINIC | Age: 60
End: 2025-08-29
Payer: COMMERCIAL

## 2025-08-29 DIAGNOSIS — R11.0 NAUSEA: Primary | ICD-10-CM

## 2025-09-01 RX ORDER — ONDANSETRON 4 MG/1
4 TABLET, FILM COATED ORAL EVERY 8 HOURS PRN
Qty: 30 TABLET | Refills: 0 | Status: SHIPPED | OUTPATIENT
Start: 2025-09-01 | End: 2025-09-11

## 2025-09-02 RX ORDER — SEMAGLUTIDE 2.4 MG/.75ML
2.4 INJECTION, SOLUTION SUBCUTANEOUS WEEKLY
Qty: 3 ML | Refills: 2 | Status: SHIPPED | OUTPATIENT
Start: 2025-09-02 | End: 2025-09-24

## 2025-10-29 ENCOUNTER — APPOINTMENT (OUTPATIENT)
Dept: HEMATOLOGY/ONCOLOGY | Facility: CLINIC | Age: 60
End: 2025-10-29
Payer: COMMERCIAL